# Patient Record
Sex: MALE | Race: WHITE | Employment: FULL TIME | URBAN - METROPOLITAN AREA
[De-identification: names, ages, dates, MRNs, and addresses within clinical notes are randomized per-mention and may not be internally consistent; named-entity substitution may affect disease eponyms.]

---

## 2020-09-09 ENCOUNTER — OFFICE VISIT (OUTPATIENT)
Dept: ORTHOPEDIC SURGERY | Age: 35
End: 2020-09-09
Payer: COMMERCIAL

## 2020-09-09 VITALS — HEIGHT: 78 IN | BODY MASS INDEX: 20.25 KG/M2 | WEIGHT: 175 LBS | RESPIRATION RATE: 12 BRPM

## 2020-09-09 PROBLEM — M54.50 LOW BACK PAIN: Status: ACTIVE | Noted: 2020-09-09

## 2020-09-09 PROCEDURE — 99204 OFFICE O/P NEW MOD 45 MIN: CPT | Performed by: STUDENT IN AN ORGANIZED HEALTH CARE EDUCATION/TRAINING PROGRAM

## 2020-09-09 RX ORDER — IBUPROFEN 200 MG
200 TABLET ORAL EVERY 6 HOURS PRN
COMMUNITY

## 2020-09-09 RX ORDER — METHYLPREDNISOLONE 4 MG/1
TABLET ORAL
Qty: 1 KIT | Refills: 0 | Status: SHIPPED | OUTPATIENT
Start: 2020-09-09 | End: 2020-09-15

## 2020-09-09 RX ORDER — CYCLOBENZAPRINE HCL 10 MG
10 TABLET ORAL NIGHTLY
Qty: 30 TABLET | Refills: 0 | Status: SHIPPED | OUTPATIENT
Start: 2020-09-09 | End: 2020-10-09

## 2020-09-09 NOTE — PROGRESS NOTES
New Patient: SPINE    Referring Provider:  No ref. provider found    Chief Complaint   Patient presents with    Lower Back Pain     NP, LSP    Leg Pain     Intermittent bilateral leg and groin pain       HISTORY OF PRESENT ILLNESS:      · The patient is being sent at the request of No ref. provider found in consultation as a new spine patient for low back pain and bilateral leg pain. The patient is a 38 y.o. male whom reports symptoms for several months. Symptoms progressed over the last  2 months. Patient reports there was not a significant event to cause the symptoms. Today discomfort is report at 5 out of 10, describing it as sharp, dull, aching, throbbing, shooting. Symptoms are aggravated by: quick movement, bending, lifting, playing golf. Patient has undergone recent treatment including, OTC NSAID's and chiropractor treatments. Patient denies previous lumbar spine surgery. · Mr. Fidencio Carpio presents as a new patient complaining of low back pain for several years. He states over past 4 months his pain has increased in frequency and intensity. He states he experiences flare ups quite often however he cannot identify certain triggers. The patient rates his pain 9/10 at its worst especially with lifting or pushing. Dull, aching pain radiates to the bilateral legs to the knees. He reports bending and lifting worsen his pain. Relieving measures include rest, elevation of the legs, stretching and NSAID's. The patient describes that he does have to limp when his pain increases in intensity. Recent treatment includes chiropractic care for 6-8 visits within the last year. Within the last 3 weeks he has had two visits with the chiropractor however this did not provide relief. The patient does not present with any ambulatory devices in the office. The patient can sit, stand and walk for 5 minutes without a considerable amount pain.      Pain Assessment  Location of Pain: Back(LSP)  Location Modifiers: Left, Right, Posterior(Legs)  Severity of Pain: 5  Quality of Pain: Dull, Other (Comment), Sharp(Radiating, shooting pain)  Duration of Pain: Persistent  Frequency of Pain: Constant  Aggravating Factors: Bending(Lifting; Playing golf; quick movements)  Limiting Behavior: Yes  Relieving Factors: Rest  Result of Injury: No  Work-Related Injury: No  Are there other pain locations you wish to document?: No      Associated signs and symptoms:   Neurogenic bowel or bladder symptoms:  no   Perceived weakness:  no   Difficulty walking:  no    Recent Imaging (within past one year)   Xrays: no   MRI or CT of spine: no    Current/Past Treatment:   · Physical Therapy:  none  · Chiropractic:  yes  · Injection:  none  · Medications:   NSAIDS:  yes   Muscle relaxer:  none   Steriods:  none   Neuropathic medications:  none   Opioids:  none  · Previous surgery:  no  · Previous surgical consult:  no  · Other:  · Infection control  · Tested positive for MRSA in past 12 months:  no  · Tested positive for MSSA \"staph infection\" in past 12 months: no  · Tested positive for VRE (Vancomycin Resistant Enterococci) in past 12 months:   no  · Currently on any antibiotics for an infection: no  · Anticoagulants:  · On a blood thinner:  no   · Any history of bleeding disorder: no   · MRI Contraindication: no   · Previous Pain Management: no   · Goal for treatment: Reduce pain  · How long can you stand? Varies    Sit? Varies      Walk? Varies      Past medical, surgical, social and family history reviewed with the patient.  No pertinent relevant history            Past Medical History:   Past Medical History:   Diagnosis Date    Low back pain       Past Surgical History:     Past Surgical History:   Procedure Laterality Date    NOSE SURGERY       Current Medications:     Current Outpatient Medications:     ibuprofen (ADVIL;MOTRIN) 200 MG tablet, Take 200 mg by mouth every 6 hours as needed for Pain, Disp: , Rfl:     methylPREDNISolone (MEDROL, RUDOLPH,) 4 MG tablet, Take by mouth., Disp: 1 kit, Rfl: 0    cyclobenzaprine (FLEXERIL) 10 MG tablet, Take 1 tablet by mouth nightly, Disp: 30 tablet, Rfl: 0  Allergies:  Patient has no known allergies. Social History:    reports that he has been smoking. He has never used smokeless tobacco. He reports current alcohol use. He reports that he does not use drugs. Family History:   Family History   Problem Relation Age of Onset    Asthma Mother     No Known Problems Father     Asthma Brother        REVIEW OF SYSTEMS: ROS - 14 point    Constitutional: No fevers, chills, night sweats, unexplained weight loss  Eye: No vision changes or diplopia  ENT: No nasal congestion, postnasal drip or sore throat. No tinnitus  Respiratory: No cough or SOB  CV: No chest pain or palpitations  GI: No nausea, abdominal pain, stool changes  : No dysuria or hematuria  Skin: No new or changing skin lesions, no rashes  MSK: No joint swelling, morning stiffness, unusual joint pain  Neurological: No headache, confusion, syncope  Psychiatric: No excessive anxiety or depression  Endocrine: No polyuria or polydipsia  Hematologic: No lymph node enlargement or excessive bleeding  Immunologic:No history of immune deficiency or immunomodulating drugs           PHYSICAL EXAM:    Vitals: Resp. rate 12, height 6' 6\" (1.981 m), weight 175 lb (79.4 kg). GENERAL EXAM:  · General Apparence: Patient is adequately groomed with no evidence of malnutrition. · Psychiatric: Orientation: The patient is oriented to time, place and person. The patient's mood and affect are appropriate   · Vascular: Examination reveals no swelling and palpation reveals no tenderness in upper or lower extremities. Good capillary refill.    · The lymphatic examination of the neck, axillae and groin reveals all areas to be without enlargement or induration   Sensation is intact without deficit in the upper and lower extremities to light touch and pinprick  · Coordination of the upper and lower extremities are normal.    CERVICAL EXAMINATION:  · Inspection: Local inspection shows no step-off or bruising. Cervical alignment is normal. No instability is noted. · Palpation and Percussion: No evidence of tenderness at the midline. Paraspinal tenderness is not present. There is no paraspinal spasm. · Range of Motion:  pain-free ROM   · Strength: 5/5 bilateral upper extremities  · Special Tests:   Spurling's and Hinds's are negative bilaterally. Mcgee and Impingement tests are negative bilaterally. · Skin:There are no rashes, ulcerations or lesions. · Reflexes: Bilaterally triceps, biceps and brachioradialis are 2+. Clonus absent bilaterally at the feet. No pathological reflexes are noted. · Gait & station:  normal, patient ambulates without assistance and no ataxia  · Additional Examinations:  · RIGHT UPPER EXTREMITY:  Inspection/examination of the right upper extremity does not show any tenderness, deformity or injury. Range of motion is normal and pain-free. There is no gross instability. There are no rashes, ulcerations or lesions. Strength and tone are normal. No atrophy or abnormal movements are noted. · LEFT UPPER EXTREMITY: Inspection/examination of the left upper extremity does not show any tenderness, deformity or injury. Range of motion is normal and pain-free. There is no gross instability. There are no rashes, ulcerations or lesions. Strength and tone are normal. No atrophy or abnormal movements are noted. LUMBAR/SACRAL EXAMINATION:  · Inspection: Local inspection shows no step-off or bruising. Lumbar alignment is normal. No instability is noted. · Palpation:   No evidence of tenderness at the midline. Lumbar paraspinal tenderness Mild L4/5 and L5/S1 tenderness  Bursal tenderness No tenderness bilaterally  There is no paraspinal spasm.   · Range of Motion: limited by 50% in all planes due to pain  · Strength:   Strength testing is 5/5 in all muscle groups tested. · Special Tests:   Straight leg raise positive left and crossed SLR negative. Jp's testing is negative bilaterally. FADIR's testing is negative bilaterally. Slump test negative. Bowstring test negative  · Skin: There are no rashes, ulcerations or lesions. · Reflexes: Reflexes are symmetrically 1+ at the patellar and ankle tendons. Clonus absent bilaterally at the feet. · Gait & station: normal, patient ambulates without assistance and no ataxia  · Additional Examinations:  · RIGHT LOWER EXTREMITY: Inspection/examination of the right lower extremity does not show any tenderness, deformity or injury. Range of motion is unremarkable. There is no gross instability. There are no rashes, ulcerations or lesions. Strength and tone are normal. No atrophy or abnormal movements are noted. · LEFT LOWER EXTREMITY:  Inspection/examination of the left lower extremity does not show any tenderness, deformity or injury. Range of motion is unremarkable. There is no gross instability. There are no rashes, ulcerations or lesions. Strength and tone are normal. No atrophy or abnormal movements are noted. Diagnostic Testing:    Xrays:   AP and lateral of the lumbar spine taken today in the office show 5 lumbar type vertebrae, good alignment on lateral films, mild L5/S1 DDD  MRI or CT:  None  EMG:  None  No results found for this or any previous visit. Impression (Medical Decision Making):       1. Lumbar radiculopathy    2. HNP (herniated nucleus pulposus), lumbar    3. Acute myofascial strain of lumbar region, initial encounter    4. Pain of lumbar spine    5. Acute bilateral low back pain without sciatica        Plan (Medical Decision Making):    I discussed the diagnosis and the treatment options with Stephania Crawford today.      In Summary:  The various treatment options were outlined and discussed with Stephania Crawford including:  Conservative care options: physical therapy, ice, medications, bracing, and activity modification. The indications for therapeutic injections. The indications for additional imaging/laboratory studies. The indications for (possible future) interventions. After considering the various options discussed, Brenda Kingston elected to pursue a course of treatment that includes the followin. Medications: I will prescribe a medrol dose pack to help with the inflammatory component of pain. Risks, benefits and alternatives were discussed. Recommended to stop any NSAIDs to reduce risk of GI bleed during the course of the dose pack. 2. PT:  I will start the patient on a trial of PT to work on a lumbar stabilization program to focus on core strengthening, core stabilizing, lumbar stretches, hamstring flexibility, modalities as indicated for 6-8 visits over the next 4-6 weeks. 3. Further studies: Setup Lumbar MR without contrast to evaluate for soft tissue pathology or stenosis contributing to the back pain and paresthesia. The patient has failed a three week trial of chiropractic care and home exercise program in the last 6 weeks. 4. Interventional:  After further imaging is obtained, interventional options will be reviewed and recommended. 5. Healthy Lifestyle Measures:  Patient education material reviewing the following was distributed to Brenda Kingston  Anatomic drawings  Healthy lifestyle education  Osteoporosis prevention,   Back and neck pain educational information   Advanced imaging preparedness    Posture education   Proper lifting and carrying techniques,   Weight management  Quitting smoking and   Minor ways to treat back pain  For further information regarding the spine conditions and to review interventional treatments the patient was directed to [facility].    6.  Follow up:  4-6 weeks    Brenda Kingston was instructed to call the office if his symptoms worsen or if new symptoms appear prior to the next scheduled visit.  He is specifically instructed to contact the office between now & his scheduled appointment if he has concerns related to his condition or if he needs assistance in scheduling the above tests. He is welcome to call for an appointment sooner if he has any additional concerns or questions. Macarena WILKERSON ATC, am scribing for Jaleel Cavazos.  09/09/20 10:17 AM Macarena Fatima. [facility], personally performed the services described in this documentation as scribed by Macarena Lizama ATC in my presence and it is both accurate and complete. The physical examination was performed between the patient and SHANNA Cavazos. All counseling during the appointment was performed between the patient and the provider. EDGAR AdamsC  Board Certified by the [employer] on Certification of Physician Assistants  Estella Urban 57  Partner of [employer]           This dictation was performed with a verbal recognition program [facility]) and it was checked for errors. It is possible that there are still dictated errors within this office note. If so, please bring any errors to my attention for an addendum. All efforts were made to ensure that this office note is accurate.

## 2020-09-10 ENCOUNTER — TELEPHONE (OUTPATIENT)
Dept: ORTHOPEDIC SURGERY | Age: 35
End: 2020-09-10

## 2020-09-10 NOTE — TELEPHONE ENCOUNTER
S/W PATIENT regarding MRI LSP approval and authorization being valid until 10/8/2020. Patient was instructed to call [employer] [facility] to schedule MRI LSP, then contact our office for follow up appointment. MRI LSP results will not be given over the phone or via [employer]. Patient was also asked to allow a minimum 48 hours for follow up appointment from MRI scan in order for staff to obtain MRI report. Patient currently has a follow up appointment schedule for 10/7 @ [facility]. Advised to contact the office if needing to reschedule this to accommodate MRI scan. Patient voiced understanding of MRI results not being given over the phone.

## 2020-09-18 ENCOUNTER — HOSPITAL ENCOUNTER (OUTPATIENT)
Dept: PHYSICAL THERAPY | Age: 35
Setting detail: THERAPIES SERIES
Discharge: HOME | End: 2020-09-18
Payer: COMMERCIAL

## 2020-09-18 PROCEDURE — 97161 PT EVAL LOW COMPLEX 20 MIN: CPT

## 2020-09-18 PROCEDURE — 97110 THERAPEUTIC EXERCISES: CPT

## 2020-09-18 NOTE — PLAN OF CARE
The [address] [place] [address] and [address]  Phone [phone]  Fax [phone]      Physical Therapy Certification    Dear Referring Practitioner: SHANNA Strauss,    We had the pleasure of evaluating the following patient for physical therapy services at [address] [place] [address]. A summary of our findings can be found in the initial assessment below. This includes our plan of care. If you have any questions or concerns regarding these findings, please do not hesitate to contact me at the office phone number checked above. Thank you for the referral.       Physician Signature:_______________________________Date:__________________  By signing above (or electronic signature), therapists plan is approved by physician      Patient: Leanna Garner   : 1985   MRN: [id number]  Referring Physician: Referring Practitioner: SHANNA Strauss      Evaluation Date: 2020      Medical Diagnosis Information:  Diagnosis: M54.16 (ICD-10-CM) - Lumbar radiculopathy ; M51.26 (ICD-10-CM) - HNP (herniated nucleus pulposus), lumbar; S39.012A (ICD-10-CM) - Acute myofascial strain of lumbar region, initial encounter   Treatment Diagnosis: M54.5 Low Back Pain                                         Insurance information: PT Insurance Information: [employer]     Precautions/ Contra-indications: None  Latex Allergy:  [x]NO      []YES  Preferred Language for Healthcare:   [x]English       []other:    SUBJECTIVE: Patient stated complaint: Patient is a 38year old male who presents to the clinic with reports of low back pain. Patient states that he has been having back pain for the past few months but within the past 8 months the pain has gotten worse. He states that the pain is becoming more frequent and he has discomfort everyday. He states that he is having trouble playing golf and performing his job.  He states that he decided to seek medical attention when he was unable to play a few rounds of golf. He states that he had radiographs performed that were negative for fracture but denies further imaging. He states that he is hoping to schedule and MRI in the next two weeks. He denies numbness and tingling into the LE's but states that he has dull achy pain and his increased tightness. He states that he has trouble with bending and twisting. He denies red flag symptoms at this time.      Relevant Medical History:  C-SSRS Triggered by Intake questionnaire (Past 2 wk assessment):   1 No, Questionnaire did not trigger screening.   0 Yes, Patient intake triggered further evaluation      0 C-SSRS Screening completed  0 PCP notified via Plan of Care  0 Emergency services notified    Functional Disability Index/G-Codes: Oswestry: 34% deficit    Pain Scale: 4-9/10  Easing factors: prone position; sitting  Provocative factors: Dressing; transitioning from forward flexion to extension; lifting     Type: [x]Constant           []Intermittent      []Radiating         []Localized         []other:                Numbness/Tingling: Denies     Occupation/School: [employer]     Living Status/Prior Level of Function: Independent with ADLs and IADLs,     OBJECTIVE:   ROM  9/18   Comments   Trunk flexion WNL + pain upon returning    Trunk extension WNL    Trunk R sidebend WNL    Trunk L sidebend WNL    Trunk R rotation WNL    Trunk L rotation WNL    HS flexibility Severe restriction                       Strength  9/18 Left Right Comments   Hip flexion(L2) 5 5 + pain present    Knee extension(L3) 5 5    Knee flexion(S1-2) 5 5    Ankle dorsiflexion(L4) 5 5    Toe extension(L5) 5 5    Ankle eversion/plantar flexion(S1) 5 5    Hip abd             Special tests 9/18   Comments   SLR      Slump test      Pelvic symmetry       Segmental Spinal mobility      Heel walk negative     Toe walk negative     Tandem walk negative                DTRs  9/18 Left Right Comments Patellar(L3-L4) = =     Achilles(S1-S2) = =                  Joint mobility: 9/18              [x]Normal               []Hypo              []Hyper     Palpation: Denies TTP  9/18     Functional Mobility/Transfers: Independent with increased pain with ADL's; light duty at work; difficulty sleeping; unable to sit for periods longer than 1 hour; unable to walk distances longer than 1 mile; tightness and pain with playing golf  9/18      Posture: Decreased lumbar lordosis  9/18     Gait: (include devices/WB status) WNL  9/18                          [x] Patient history, allergies, meds reviewed. Medical chart reviewed. See intake form. 9/18     Review Of Systems (ROS):  [x]Performed Review of systems (Integumentary, CardioPulmonary, Neurological) by intake and observation. Intake form has been scanned into medical record. Patient has been instructed to contact their primary care physician regarding ROS issues if not already being addressed at this time.   9/18     Co-morbidities/Complexities (which will affect course of rehabilitation):   [x]None              Arthritic conditions   []Rheumatoid arthritis (M05.9)  []Osteoarthritis (M19.91)    Cardiovascular conditions   []Hypertension (I10)  []Hyperlipidemia (E78.5)  []Angina pectoris (I20)  []Atherosclerosis (I70)    Musculoskeletal conditions   []Disc pathology   []Congenital spine pathologies   []Prior surgical intervention  []Osteoporosis (M81.8)  []Osteopenia (M85.8)   Endocrine conditions   []Hypothyroid (E03.9)  []Hyperthyroid Gastrointestinal conditions   []Constipation (H82.55)    Metabolic conditions   []Morbid obesity (E66.01)  []Diabetes type 1(E10.65) or 2 (E11.65)   []Neuropathy (G60.9)      Pulmonary conditions   []Asthma (J45)  []Coughing   []COPD (J44.9)    Psychological Disorders  []Anxiety (F41.9)  []Depression (F32.9)   []Other:    []Other:            Barriers to/and or personal factors that will affect rehab potential:              [x]Age  [x]Sex [x]Motivation/Lack of Motivation                        []Co-Morbidities              []Cognitive Function, education/learning barriers              []Environmental, home barriers              []profession/work barriers  [x]past PT/medical experience  []other:  Justification:      Falls Risk Assessment (30 days):   [x] Falls Risk assessed and no intervention required.   [] Falls Risk assessed and Patient requires intervention due to being higher risk   TUG score (>12s at risk):     [] Falls education provided, including        ASSESSMENT:   Functional Impairments:                []Noted lumbar/proximal hip hypomobility              []Noted lumbosacral and/or generalized hypermobility              [x]Decreased Lumbosacral/hip/LE functional ROM              [x]Decreased core/proximal hip strength and neuromuscular control               [x]Decreased LE functional strength               []Abnormal reflexes/sensation/myotomal/dermatomal deficits  []Reduced balance/proprioceptive control               []other:       Functional Activity Limitations (from functional questionnaire and intake)              [x]Reduced ability to tolerate prolonged functional positions              [x]Reduced ability or difficulty with changes of positions or transfers between positions              [x]Reduced ability to maintain good posture and demonstrate good body mechanics with sitting, bending, and lifting              [x]Reduced ability to sleep              [] Reduced ability or tolerance with driving and/or computer work              [x]Reduced ability to perform lifting, reaching, carrying tasks              [x]Reduced ability to squat              [x]Reduced ability to forward bend              [x]Reduced ability to ambulate prolonged functional periods/distances/surfaces              []Reduced ability to ascend/descend stairs              []other:       Participation Restrictions              []Reduced participation in self (impairments), activity limitations, and/or participation restrictions;:  [x] a total of 1-2 or more elements   [] a total of 3 or more elements   [] a total of 4 or more elements   [x] A clinical presentation with:  [x] stable and/or uncomplicated characteristics   [] evolving clinical presentation with changing characteristics  [] unstable and unpredictable characteristics;   [x] Clinical decision making of [x] low, [] moderate, [] high complexity using standardized patient assessment instrument and/or measurable assessment of functional outcome. [x] EVAL (LOW) 19949 (typically 20 minutes face-to-face)  [] EVAL (MOD) 15723 (typically 30 minutes face-to-face)  [] EVAL (HIGH) 67665 (typically 45 minutes face-to-face)  [] RE-EVAL            PLAN:      Frequency/Duration:  1-2 days per week for 6 Weeks:  Interventions:  [x]  Therapeutic exercise including: strength training, ROM, for LE, Glutes and core   [x]  NMR activation and proprioception for glutes , LE and Core   [x]  Manual therapy as indicated for Hip complex, LE and spine to include: Dry Needling/IASTM, STM, PROM, Gr I-IV mobilizations, manipulation. [x]  Modalities as needed that may include: thermal agents, E-stim, Biofeedback, US, iontophoresis as indicated  [x]  Patient education on joint protection, postural re-education, activity modification, progression of HEP. HEP instruction:   Access Code: [email or web address]   URL: [email or web address]   Date: 09/18/2020   Prepared by: Alexia Gerber        GOALS:   Patient stated goal: Reduce pain; Be able to play golf    [] Progressing: [] Met: [] Not Met: [] Adjusted    Therapist goals for Patient:   Short Term Goals: To be achieved in: 2 weeks  1. Independent in HEP and progression per patient tolerance, in order to prevent re-injury. [] Progressing: [] Met: [] Not Met: [] Adjusted   2.  Patient will have a decrease in pain to facilitate improvement in movement, function, and ADLs as indicated by Functional Deficits. [] Progressing: [] Met: [] Not Met: [] Adjusted    Long Term Goals: To be achieved in: 6 weeks  1. Disability index score of 15% or less for the [place] to assist with reaching prior level of function. [] Progressing: [] Met: [] Not Met: [] Adjusted  2. Patient will demonstrate increased AROM to WNL, good LS mobility, good hip ROM to allow for proper joint functioning as indicated by patients Functional Deficits. [] Progressing: [] Met: [] Not Met: [] Adjusted  3. Patient will demonstrate an increase in Strength to good proximal hip and core activation to allow for proper functional mobility as indicated by patients Functional Deficits. [] Progressing: [] Met: [] Not Met: [] Adjusted  4. Patient will return to Independent functional activities without increased symptoms or restriction. [] Progressing: [] Met: [] Not Met: [] Adjusted  5. Patient will report playing a round of golf with no increase in pain.     [] Progressing: [] Met: [] Not Met: [] Adjusted       Electronically signed by: Emanuel Chowdhury, PT, DPT

## 2020-09-18 NOTE — FLOWSHEET NOTE
[address] [facility],  [address]  Phone: [phone]   Fax:     [phone]    Physical Therapy Daily Treatment Note  Date:  2020    Patient Name:  Nguyen Park    :  1985  MRN: [id number]  Restrictions/Precautions:    Medical/Treatment Diagnosis Information:  Diagnosis: M54.16 (ICD-10-CM) - Lumbar radiculopathy ; M51.26 (ICD-10-CM) - HNP (herniated nucleus pulposus), lumbar; S39.012A (ICD-10-CM) - Acute myofascial strain of lumbar region, initial encounter  Treatment Diagnosis: M54.5 Low Back Pain  Insurance/Certification information:  PT Insurance Information: [employer]  Physician Information:  Referring Practitioner: SHANNA Allison  Has the plan of care been signed (Y/N):        []  Yes  [x]  No     Date of Patient follow up with Physician:       Is this a Progress Report:     []  Yes  [x]  No        If Yes:  Date Range for reporting period:  Beginning 20  Ending    Progress report will be due (10 Rx or 30 days whichever is less):       Recertification will be due (POC Duration  / 90 days whichever is less):         Visit # Insurance Allowable Auth Required   1  20 []  Yes [x]  No        Functional Scale: Oswestry: 36% deficit   Date assessed:  20     Latex Allergy:  [x]NO      []YES  Preferred Language for Healthcare:   [x]English       []other:      Pain level:  4-9/10     SUBJECTIVE:  See eval        OBJECTIVE:   ROM     Comments   Trunk flexion WNL + pain upon returning    Trunk extension WNL    Trunk R sidebend WNL    Trunk L sidebend WNL    Trunk R rotation WNL    Trunk L rotation WNL    HS flexibility Severe restriction                       Strength   Left Right Comments   Hip flexion(L2) 5 5 + pain present    Knee extension(L3) 5 5    Knee flexion(S1-2) 5 5    Ankle dorsiflexion(L4) 5 5    Toe extension(L5) 5 5    Ankle eversion/plantar flexion(S1) 5 5 Hip abd             Special tests 9/18   Comments   SLR      Slump test      Pelvic symmetry       Segmental Spinal mobility      Heel walk negative     Toe walk negative     Tandem walk negative                DTRs  9/18 Left Right Comments   Patellar(L3-L4) = =     Achilles(S1-S2) = =                  Joint mobility: 9/18              [x]Normal               []Hypo              []Hyper     Palpation: Denies TTP  9/18     Functional Mobility/Transfers: Independent with increased pain with ADL's; light duty at work; difficulty sleeping; unable to sit for periods longer than 1 hour; unable to walk distances longer than 1 mile; tightness and pain with playing golf  9/18      Posture: Decreased lumbar lordosis  9/18     Gait: (include devices/WB status) WNL  9/18                          [x] Patient history, allergies, meds reviewed. Medical chart reviewed. See intake form. 9/18     Review Of Systems (ROS):  [x]Performed Review of systems (Integumentary, CardioPulmonary, Neurological) by intake and observation. Intake form has been scanned into medical record. Patient has been instructed to contact their primary care physician regarding ROS issues if not already being addressed at this time.   9/18    RESTRICTIONS/PRECAUTIONS: None    Exercises/Interventions:   ROM/stretches     SKTC     DKTC 30 sec x 5    Prayer stretch     Supine HS 30 sec x 5    Pelvic tilt     Hook lying rotation 30x    Cat and camel 30x    Supine Piriformis 30 sec x 5    Strengthening     SLR     Quadruped alternate UE reaches     Quadruped alternate LE reaches     Quadruped alternate UE/LE reaches     Enconcert heel raises     Sit ups      planks     Tband lat pulls     Tband rows         Manual Intervention             Prone PA      GISTM/STM      Lumbar Manip      SI Manip      Hip belt mobs      Hip LA distraction              Therapeutic Exercise and NMR EXR  [x] (10844) Provided verbal/tactile cueing for activities related to strengthening, flexibility, endurance, ROM  for improvements in proximal hip and core control with self care, mobility, lifting and ambulation.  [] (33506) Provided verbal/tactile cueing for activities related to improving balance, coordination, kinesthetic sense, posture, motor skill, proprioception  to assist with core control in self care, mobility, lifting, and ambulation. Therapeutic Activities:    [] (64866 or 48112) Provided verbal/tactile cueing for activities related to improving balance, coordination, kinesthetic sense, posture, motor skill, proprioception and motor activation to allow for proper function  with self care and ADLs  [] (20959) Provided training and instruction to the patient for proper core and proximal hip recruitment and positioning with ambulation re-education     Home Exercise Program:    [x] (84861) Reviewed/Progressed HEP activities related to strengthening, flexibility, endurance, ROM of core, proximal hip and LE for functional self-care, mobility, lifting and ambulation   [] (43207) Reviewed/Progressed HEP activities related to improving balance, coordination, kinesthetic sense, posture, motor skill, proprioception of core, proximal hip and LE for self care, mobility, lifting, and ambulation      Manual Treatments:  PROM / STM / Oscillations-Mobs:  G-I, II, III, IV (PA's, Inf., Post.)  [] (21650) Provided manual therapy to mobilize proximal hip and LS spine soft tissue/joints for the purpose of modulating pain, promoting relaxation,  increasing ROM, reducing/eliminating soft tissue swelling/inflammation/restriction, improving soft tissue extensibility and allowing for proper ROM for normal function with self care, mobility, lifting and ambulation.      Modalities: P 15 min  9/18      Charges:  Timed Code Treatment Minutes: EVAL + 25   Total Treatment Minutes: 60       [x] EVAL (LOW) 02681 (typically 20 minutes face-to-face)  [] EVAL (MOD) 88968 (typically 30 minutes

## 2020-09-25 ENCOUNTER — HOSPITAL ENCOUNTER (OUTPATIENT)
Dept: PHYSICAL THERAPY | Age: 35
Setting detail: THERAPIES SERIES
Discharge: HOME | End: 2020-09-25
Payer: COMMERCIAL

## 2020-09-25 PROCEDURE — 97110 THERAPEUTIC EXERCISES: CPT

## 2020-09-25 PROCEDURE — 97140 MANUAL THERAPY 1/> REGIONS: CPT

## 2020-09-25 NOTE — FLOWSHEET NOTE
[place] [id number] [address]  Phone: [phone]   Fax:     [phone]    Physical Therapy Daily Treatment Note  Date:  2020    Patient Name:  Bradley Haddad    :  1985  MRN: [id number]  Restrictions/Precautions:    Medical/Treatment Diagnosis Information:  Diagnosis: M54.16 (ICD-10-CM) - Lumbar radiculopathy ; M51.26 (ICD-10-CM) - HNP (herniated nucleus pulposus), lumbar; S39.012A (ICD-10-CM) - Acute myofascial strain of lumbar region, initial encounter  Treatment Diagnosis: M54.5 Low Back Pain  Insurance/Certification information:  PT Insurance Information: [employer]  Physician Information:  Referring Practitioner: SHANNA Glover  Has the plan of care been signed (Y/N):        []  Yes  [x]  No     Date of Patient follow up with Physician:       Is this a Progress Report:     []  Yes  [x]  No        If Yes:  Date Range for reporting period:  Beginning 20  Ending    Progress report will be due (10 Rx or 30 days whichever is less):       Recertification will be due (POC Duration  / 90 days whichever is less):         Visit # Insurance Allowable Auth Required   2  20 []  Yes [x]  No        Functional Scale: Oswestry: 36% deficit   Date assessed:  20     Latex Allergy:  [x]NO      []YES  Preferred Language for Healthcare:   [x]English       []other:      Pain level:  2/10     SUBJECTIVE:   Patient states that he woke up feeling really tight this morning. He states that he did his stretches and feels a little better.      OBJECTIVE:   ROM     Comments   Trunk flexion WNL + pain upon returning    Trunk extension WNL    Trunk R sidebend WNL    Trunk L sidebend WNL    Trunk R rotation WNL    Trunk L rotation WNL    HS flexibility Severe restriction                       Strength   Left Right Comments   Hip flexion(L2) 5 5 + pain present    Knee extension(L3) 5 5    Knee flexion(S1-2) 5 5    Ankle dorsiflexion(L4) 5 5    Toe extension(L5) 5 5    Ankle eversion/plantar flexion(S1) 5 5    Hip abd             Special tests 9/18   Comments   SLR      Slump test      Pelvic symmetry       Segmental Spinal mobility      Heel walk negative     Toe walk negative     Tandem walk negative                DTRs  9/18 Left Right Comments   Patellar(L3-L4) = =     Achilles(S1-S2) = =                  Joint mobility: 9/18              [x]Normal               []Hypo              []Hyper     Palpation: Denies TTP  9/18     Functional Mobility/Transfers: Independent with increased pain with ADL's; light duty at work; difficulty sleeping; unable to sit for periods longer than 1 hour; unable to walk distances longer than 1 mile; tightness and pain with playing golf  9/18      Posture: Decreased lumbar lordosis  9/18     Gait: (include devices/WB status) WNL  9/18                          [x] Patient history, allergies, meds reviewed. Medical chart reviewed. See intake form. 9/18     Review Of Systems (ROS):  [x]Performed Review of systems (Integumentary, CardioPulmonary, Neurological) by intake and observation. Intake form has been scanned into medical record. Patient has been instructed to contact their primary care physician regarding ROS issues if not already being addressed at this time.   9/18    RESTRICTIONS/PRECAUTIONS: None    Exercises/Interventions:   ROM/stretches     SKTC     DKTC 30 sec x 5    Prayer stretch     Supine HS 30 sec x 5    Pelvic tilt     Hook lying rotation 30x    Cat and camel 30x    Supine Piriformis 30 sec x 5    Strengthening     TA Iso 10 sec x 10 Start 9/25   TA Iso + alt marching 10 sec x 10 Start 9/25   SLR 3 x 10 Start 9/25   Quadruped alternate UE reaches     Quadruped alternate LE reaches     Quadruped alternate UE/LE reaches     Cellmax heel raises     Sit ups      planks     Tband lat pulls     Tband rows         Manual Intervention             Prone PA      GISTM/STM      Lumbar Manip      SI Manip      Hip belt mobs      Hip LA distraction 10 min  Start 9/25   [facility] Thoracic paraspinals 10 min  Start 9/25     Therapeutic Exercise and NMR EXR  [x] (08987) Provided verbal/tactile cueing for activities related to strengthening, flexibility, endurance, ROM  for improvements in proximal hip and core control with self care, mobility, lifting and ambulation.  [] (16647) Provided verbal/tactile cueing for activities related to improving balance, coordination, kinesthetic sense, posture, motor skill, proprioception  to assist with core control in self care, mobility, lifting, and ambulation.      Therapeutic Activities:    [] (33847 or 65589) Provided verbal/tactile cueing for activities related to improving balance, coordination, kinesthetic sense, posture, motor skill, proprioception and motor activation to allow for proper function  with self care and ADLs  [] (92007) Provided training and instruction to the patient for proper core and proximal hip recruitment and positioning with ambulation re-education     Home Exercise Program:    [x] (84453) Reviewed/Progressed HEP activities related to strengthening, flexibility, endurance, ROM of core, proximal hip and LE for functional self-care, mobility, lifting and ambulation   [] (42341) Reviewed/Progressed HEP activities related to improving balance, coordination, kinesthetic sense, posture, motor skill, proprioception of core, proximal hip and LE for self care, mobility, lifting, and ambulation      Manual Treatments:  PROM / STM / Oscillations-Mobs:  G-I, II, III, IV (PA's, Inf., Post.)  [x] (42245) Provided manual therapy to mobilize proximal hip and LS spine soft tissue/joints for the purpose of modulating pain, promoting relaxation,  increasing ROM, reducing/eliminating soft tissue swelling/inflammation/restriction, improving soft tissue extensibility and allowing for proper ROM for normal function with self care, mobility, lifting and ambulation. Modalities: [facility] 10 min  9/25     Charges:  Timed Code Treatment Minutes: 45   Total Treatment Minutes: 62  235-337       [] EVAL (LOW) 25231 (typically 20 minutes face-to-face)  [] EVAL (MOD) 37275 (typically 30 minutes face-to-face)  [] EVAL (HIGH) 06003 (typically 45 minutes face-to-face)  [] RE-EVAL      [x] EC(38931) x 1   [] IONTO  [] NMR (52287) x     [] VASO  [x] Manual (49326) x 2     [] Other:  [] TA x      [] Mech Traction (06552)  [] ES(attended) (26151)      [] ES (un) (26285):     Goals:   Patient stated goal: Reduce pain; Be able to play golf    [] Progressing: [] Met: [] Not Met: [] Adjusted    Therapist goals for Patient:   Short Term Goals: To be achieved in: 2 weeks  1. Independent in HEP and progression per patient tolerance, in order to prevent re-injury. [] Progressing: [] Met: [] Not Met: [] Adjusted   2. Patient will have a decrease in pain to facilitate improvement in movement, function, and ADLs as indicated by Functional Deficits. [] Progressing: [] Met: [] Not Met: [] Adjusted    Long Term Goals: To be achieved in: 6 weeks  1. Disability index score of 15% or less for the [place] to assist with reaching prior level of function. [] Progressing: [] Met: [] Not Met: [] Adjusted  2. Patient will demonstrate increased AROM to WNL, good LS mobility, good hip ROM to allow for proper joint functioning as indicated by patients Functional Deficits. [] Progressing: [] Met: [] Not Met: [] Adjusted  3. Patient will demonstrate an increase in Strength to good proximal hip and core activation to allow for proper functional mobility as indicated by patients Functional Deficits. [] Progressing: [] Met: [] Not Met: [] Adjusted  4. Patient will return to Independent functional activities without increased symptoms or restriction. [] Progressing: [] Met: [] Not Met: [] Adjusted  5. Patient will report playing a round of golf with no increase in pain.     [] Progressing: [] Met: [] Not Met: [] Adjusted     Overall Progression Towards Functional goals/ Treatment Progress Update:  [] Patient is progressing as expected towards functional goals listed. [] Progression is slowed due to complexities/Impairments listed. [] Progression has been slowed due to co-morbidities. [x] Plan just implemented, too soon to assess goals progression <30days   [] Goals require adjustment due to lack of progress  [] Patient is not progressing as expected and requires additional follow up with physician  [] Other    Prognosis for POC: [x] Good [] Fair  [] Poor      Patient requires continued skilled intervention: [x] Yes  [] No    Treatment/Activity Tolerance:  [x] Patient able to complete treatment  [] Patient limited by fatigue  [] Patient limited by pain     [] Patient limited by other medical complications  [x] Other: 9/25 Patient tolerated treatment well this session. Reported fatigue with addition of core exercises. Patient reported feeling better end of session. HEP updated and reviewed. Continue to progress as tolerated. Patient education:      Prognosis: [x] Good [] Fair  [] Poor    Patient Requires Follow-up: [x] Yes  [] No    PLAN: See eval  [x] Continue per plan of care [] Alter current plan (see comments)  [] Plan of care initiated [] Hold pending MD visit [] Discharge    Electronically signed by: Merle Desai PT, DPT    *If patient does not return for further follow ups after this date. Please consider this as the patients discharge from physical therapy.

## 2020-10-02 ENCOUNTER — HOSPITAL ENCOUNTER (OUTPATIENT)
Dept: PHYSICAL THERAPY | Age: 35
Setting detail: THERAPIES SERIES
Discharge: HOME | End: 2020-10-02
Payer: COMMERCIAL

## 2020-10-02 PROCEDURE — 97110 THERAPEUTIC EXERCISES: CPT

## 2020-10-02 PROCEDURE — 97140 MANUAL THERAPY 1/> REGIONS: CPT

## 2020-10-02 NOTE — FLOWSHEET NOTE
5 5    Knee flexion(S1-2) 5 5    Ankle dorsiflexion(L4) 5 5    Toe extension(L5) 5 5    Ankle eversion/plantar flexion(S1) 5 5    Hip abd             Special tests 9/18   Comments   SLR      Slump test      Pelvic symmetry       Segmental Spinal mobility      Heel walk negative     Toe walk negative     Tandem walk negative                DTRs  9/18 Left Right Comments   Patellar(L3-L4) = =     Achilles(S1-S2) = =                  Joint mobility: 9/18              [x]Normal               []Hypo              []Hyper     Palpation: Denies TTP  9/18     Functional Mobility/Transfers: Independent with increased pain with ADL's; light duty at work; difficulty sleeping; unable to sit for periods longer than 1 hour; unable to walk distances longer than 1 mile; tightness and pain with playing golf  9/18      Posture: Decreased lumbar lordosis  9/18     Gait: (include devices/WB status) WNL  9/18                          [x] Patient history, allergies, meds reviewed. Medical chart reviewed. See intake form. 9/18     Review Of Systems (ROS):  [x]Performed Review of systems (Integumentary, CardioPulmonary, Neurological) by intake and observation. Intake form has been scanned into medical record. Patient has been instructed to contact their primary care physician regarding ROS issues if not already being addressed at this time.   9/18    RESTRICTIONS/PRECAUTIONS: None    Exercises/Interventions:   ROM/stretches     SKTC     DKTC 30 sec x 5    Prayer stretch     Supine HS 30 sec x 5    Pelvic tilt     Hook lying rotation 30x    Cat and camel 30x    Supine Piriformis 30 sec x 5    Strengthening     Quadruped alternate UE reaches     Quadruped alternate LE reaches     Quadruped alternate UE/LE reaches     paloff press 3 x 10; black tband Bilateral; Start 10/2   Golf swing with band 10x bilateral; blue tband Start 10/2   Ball squats     Ball heel raises     Sit ups      planks     Tband lat pulls     Tband rows         Manual Intervention             Prone PA      GISTM/STM      Lumbar Manip      SI Manip      Hip belt mobs      Hip LA distraction 10 min  Start 9/25   [facility] Thoracic paraspinals 10 min  Start 9/25     Therapeutic Exercise and NMR EXR  [x] (14028) Provided verbal/tactile cueing for activities related to strengthening, flexibility, endurance, ROM  for improvements in proximal hip and core control with self care, mobility, lifting and ambulation.  [] (45712) Provided verbal/tactile cueing for activities related to improving balance, coordination, kinesthetic sense, posture, motor skill, proprioception  to assist with core control in self care, mobility, lifting, and ambulation.      Therapeutic Activities:    [] (10455 or 98917) Provided verbal/tactile cueing for activities related to improving balance, coordination, kinesthetic sense, posture, motor skill, proprioception and motor activation to allow for proper function  with self care and ADLs  [] (54777) Provided training and instruction to the patient for proper core and proximal hip recruitment and positioning with ambulation re-education     Home Exercise Program:    [x] (54363) Reviewed/Progressed HEP activities related to strengthening, flexibility, endurance, ROM of core, proximal hip and LE for functional self-care, mobility, lifting and ambulation   [] (97488) Reviewed/Progressed HEP activities related to improving balance, coordination, kinesthetic sense, posture, motor skill, proprioception of core, proximal hip and LE for self care, mobility, lifting, and ambulation      Manual Treatments:  PROM / STM / Oscillations-Mobs:  G-I, II, III, IV (PA's, Inf., Post.)  [x] (91955) Provided manual therapy to mobilize proximal hip and LS spine soft tissue/joints for the purpose of modulating pain, promoting relaxation,  increasing ROM, reducing/eliminating soft tissue swelling/inflammation/restriction, improving soft tissue extensibility and allowing for proper ROM for with no increase in pain. [] Progressing: [] Met: [] Not Met: [] Adjusted     Overall Progression Towards Functional goals/ Treatment Progress Update:  [] Patient is progressing as expected towards functional goals listed. [] Progression is slowed due to complexities/Impairments listed. [] Progression has been slowed due to co-morbidities. [x] Plan just implemented, too soon to assess goals progression <30days   [] Goals require adjustment due to lack of progress  [] Patient is not progressing as expected and requires additional follow up with physician  [] Other    Prognosis for POC: [x] Good [] Fair  [] Poor      Patient requires continued skilled intervention: [x] Yes  [] No    Treatment/Activity Tolerance:  [x] Patient able to complete treatment  [] Patient limited by fatigue  [] Patient limited by pain     [] Patient limited by other medical complications  [x] Other: 10/2 Patient tolerated treatment well this session. Reported fatigue with addition of core exercises. Patient reported feeling better end of session. Will follow-up with patient after playing golf and MD appointment. Continue to progress as tolerated. Patient education:      Prognosis: [x] Good [] Fair  [] Poor    Patient Requires Follow-up: [x] Yes  [] No    PLAN: See eval  [x] Continue per plan of care [] Alter current plan (see comments)  [] Plan of care initiated [] Hold pending MD visit [] Discharge    Electronically signed by: Chapito Mendoza PT, DPT    *If patient does not return for further follow ups after this date. Please consider this as the patients discharge from physical therapy.

## 2020-10-05 ENCOUNTER — TELEPHONE (OUTPATIENT)
Dept: ORTHOPEDIC SURGERY | Age: 35
End: 2020-10-05

## 2020-10-05 NOTE — TELEPHONE ENCOUNTER
Patient calls desk phone stating the order for MRI will need to be resent to [facility]. S/W PATIENT explained that order has been re-faxed from 9/10/2020.

## 2020-10-07 ENCOUNTER — OFFICE VISIT (OUTPATIENT)
Dept: ORTHOPEDIC SURGERY | Age: 35
End: 2020-10-07
Payer: COMMERCIAL

## 2020-10-07 VITALS — HEIGHT: 78 IN | BODY MASS INDEX: 20.25 KG/M2 | TEMPERATURE: 97.7 F | WEIGHT: 175.04 LBS | RESPIRATION RATE: 12 BRPM

## 2020-10-07 PROCEDURE — 99213 OFFICE O/P EST LOW 20 MIN: CPT | Performed by: STUDENT IN AN ORGANIZED HEALTH CARE EDUCATION/TRAINING PROGRAM

## 2020-10-07 NOTE — PROGRESS NOTES
Follow up: Vasquez  1985  [id number]         Chief Complaint   Patient presents with    Lower Back Pain     TR MRI LSP         HISTORY OF PRESENT ILLNESS:  Mr. Sukhdev Jean is a 38 y.o. male returns for a follow up visit for multiple medical problems. His current presenting problems are   1. HNP (herniated nucleus pulposus), lumbar    2. Lumbar radiculopathy    3. Acute myofascial strain of lumbar region, initial encounter    . As per information/history obtained from the PADT(patient assessment and documentation tool) - He complains of pain in the lower back with radiation to the buttocks and upper leg Bilateral He rates the pain 2/10 and describes it as aching. Pain is made worse by: sleeping, morning time. He denies side effects from the current pain regimen. Patient reports that since the last follow up visit the physical functioning is better, family/social relationships are better, mood is better and sleep patterns are better, and that the overall functioning is better. Patient denies neurological bowel or bladder. Mr. Sukhdev Jean presents today regarding his lumbar spine MRI completed on 10/6/2020. He has attended three physical therapy visits since his initial visit on 9/9/2020. Patient reports that this intervention has helped significantly, particularly the stretching regimen he was provided. He reports compliance with his home exercise program as well. The patient describes that there is a decent amount of stiffness in the morning time, however he is feeling much better during the day. The patient does not report radicular pain at this time and describes that the Medrol Dosepak and physical therapy has alleviated his leg pain. Other alleviating factors include stretching and heat. He does not present today with any ambulatory aids or brace utilization.  The patient describes being able to sit for 20-30 minutes, stand for various amounts of time  and walk for various amounts of time with minimal to no symptoms. Associated signs and symptoms:   Neurogenic bowel or bladder symptoms:  no   Perceived weakness:  no   Difficulty walking:  no            Past medical, surgical, social and family history reviewed with the patient. No pertinent relevant history. Past Medical History:   Past Medical History:   Diagnosis Date    Low back pain       Past Surgical History:     Past Surgical History:   Procedure Laterality Date    NOSE SURGERY       Current Medications:     Current Outpatient Medications:     ibuprofen (ADVIL;MOTRIN) 200 MG tablet, Take 200 mg by mouth every 6 hours as needed for Pain, Disp: , Rfl:     cyclobenzaprine (FLEXERIL) 10 MG tablet, Take 1 tablet by mouth nightly, Disp: 30 tablet, Rfl: 0  Allergies:  Patient has no known allergies. Social History:    reports that he has been smoking. He has never used smokeless tobacco. He reports current alcohol use. He reports that he does not use drugs. Family History:   Family History   Problem Relation Age of Onset    Asthma Mother     No Known Problems Father     Asthma Brother        REVIEW OF SYSTEMS:   CONSTITUTIONAL: Denies unexplained weight loss, fevers, chills or fatigue  NEUROLOGICAL: Denies unsteady gait or progressive weakness  MUSCULOSKELETAL: Denies joint swelling or redness  GI: Denies nausea, vomiting, diarrhea   : Denies bowel or bladder issues       PHYSICAL EXAM:    Vitals: Temperature 97.7 °F (36.5 °C), resp. rate 12, height 6' 5.99\" (1.981 m), weight 175 lb 0.7 oz (79.4 kg). GENERAL EXAM:  · General Apparence: Patient is adequately groomed with no evidence of malnutrition. · Psychiatric: Orientation: The patient is oriented to time, place and person. The patient's mood and affect are appropriate   · Vascular: Examination reveals no swelling and palpation reveals no tenderness in upper or lower extremities. Good capillary refill.    · The lymphatic examination of the neck, axillae and groin reveals all areas to be without enlargement or induration  · Sensation is intact without deficit in the upper and lower extremities to light touch and pinprick  · Coordination of the upper and lower extremities are normal.  · RIGHT UPPER EXTREMITY:  Inspection/examination of the right upper extremity does not show any tenderness, deformity or injury. Range of motion is unremarkable and pain-free. There is no gross instability. There are no rashes, ulcerations or lesions. Strength and tone are normal. No atrophy or abnormal movements are noted. · LEFT UPPER EXTREMITY: Inspection/examination of the left upper extremity does not show any tenderness, deformity or injury. Range of motion is unremarkable and pain-free. There is no gross instability. There are no rashes, ulcerations or lesions. Strength and tone are normal. No atrophy or abnormal movements are noted. LUMBAR/SACRAL EXAMINATION:  · Inspection: Local inspection shows no step-off or bruising. Lumbar alignment is normal. No instability is noted. · Palpation:   No evidence of tenderness at the midline. Lumbar paraspinal tenderness: Mild L4/5 and L5/S1 tenderness  Bursal tenderness No tenderness bilaterally  There is no paraspinal spasm. · Range of Motion: pain-free ROM  · Strength:   Strength testing is 5/5 in all muscle groups tested. · Special Tests:   Straight leg raise and crossed SLR negative. Pj's testing is negative bilaterally. FADIR's testing is negative bilaterally. Bowstring test negative. Slump test negative. · Skin: There are no rashes, ulcerations or lesions. · Reflexes: Reflexes are symmetrically 1+ at the patellar and ankle tendons. Clonus absent bilaterally at the feet. · Gait & station: normal, patient ambulates without assistance and no ataxia  · Additional Examinations:  · RIGHT LOWER EXTREMITY: Inspection/examination of the right lower extremity does not show any tenderness, deformity or injury. Range of motion is normal and pain-free.  There is no gross instability. There are no rashes, ulcerations or lesions. Strength and tone are normal. No atrophy or abnormal movements are noted. · LEFT LOWER EXTREMITY:  Inspection/examination of the left lower extremity does not show any tenderness, deformity or injury. Range of motion is normal and pain-free. There is no gross instability. There are no rashes, ulcerations or lesions. Strength and tone are normal. No atrophy or abnormal movements are noted. Diagnostic Testing:    MR Lumbar spine shows  10//  At the L5-S1 level there is a left-sided protrusion type disc herniation projecting into the recess and proximal left L5-S1 foramen. The right L5-S1 foramen is patent. The L4-5 level shows concentric bulging disc and annular tears without evidence of focal disc herniation or central spinal canal stenosis. The neural foramina patent. L3-L4, L2-L3, L1-L2, T12-L1 and T11-12 levels are normal  No results found for this or any previous visit. Impression:       1. HNP (herniated nucleus pulposus), lumbar    2. Lumbar radiculopathy    3. Acute myofascial strain of lumbar region, initial encounter        Plan:  Clinical Course: Above diagnoses are improving     I discussed the diagnosis and the treatment options with Fam Perkins today. In Summary:  The various treatment options were outlined and discussed with Fam Perkins including:  Conservative care options: physical therapy, ice, medications, bracing, and activity modification. The indications for therapeutic injections. The indications for additional imaging/laboratory studies. The indications for (possible future) interventions. After considering the various options discussed, Fam Perkins elected to pursue a course of treatment that includes the followin. Medications:  Continue anti-inflammatories with appropriate GI Precautions including to stop if develop dark tarry stools or GI upset and to take with food.     2. PT:  Encouraged to continue with Home exercise program.  Finish physical therapy visits as prescribed. 3. Further studies: No further studies. 4. Interventional:  Patient is doing significantly better after Medrol Dosepak and physical therapy. Can consider lumbar epidural steroid injection if radicular pain returns. 5. Follow up:  2-3 months      Brendan Bell was instructed to call the office if his symptoms worsen or if new symptoms appear prior to the next scheduled visit. He is specifically instructed to contact the office between now & his scheduled appointment if he has concerns related to his condition or if he needs assistance in scheduling the above tests. He is welcome to call for an appointment sooner if he has any additional concerns or questions. Kristy De Jesus ATC, am scribing for and in the presence of Krysta Arambula PA-C.    10/07/20 8:50 AM Sarath Hanson ATC    I, Krysta Arambula PAC,  personally performed the services described in this documentation as scribed by IFEANYI Mcadams in my presence and it is both accurate and complete. The physical examination was performed between the patient and Krysta Arambula PA-C All counseling during the appointment was performed between the patient and provider. Abdoul Aranda PA-C  Board Certified by the [employer] on Certification of Physician Assistants  Estella Urban 57  Partner of [facility] ([facility])         This dictation was performed with a verbal recognition program [facility]) and it was checked for errors. It is possible that there are still dictated errors within this office note. If so, please bring any errors to my attention for an addendum. All efforts were made to ensure that this office note is accurate.

## 2020-10-09 ENCOUNTER — HOSPITAL ENCOUNTER (OUTPATIENT)
Dept: PHYSICAL THERAPY | Age: 35
Setting detail: THERAPIES SERIES
Discharge: HOME | End: 2020-10-09
Payer: COMMERCIAL

## 2020-10-09 PROCEDURE — 97140 MANUAL THERAPY 1/> REGIONS: CPT

## 2020-10-09 PROCEDURE — 97110 THERAPEUTIC EXERCISES: CPT

## 2020-10-09 NOTE — FLOWSHEET NOTE
[place] [id number] [address]  Phone: [phone]   Fax:     [phone]    Physical Therapy Daily Treatment Note  Date:  10/9/2020    Patient Name:  Remy Ness    :  1985  MRN: [id number]  Restrictions/Precautions:    Medical/Treatment Diagnosis Information:  Diagnosis: M54.16 (ICD-10-CM) - Lumbar radiculopathy ; M51.26 (ICD-10-CM) - HNP (herniated nucleus pulposus), lumbar; S39.012A (ICD-10-CM) - Acute myofascial strain of lumbar region, initial encounter  Treatment Diagnosis: M54.5 Low Back Pain  Insurance/Certification information:  PT Insurance Information: [place]  Physician Information:  Referring Practitioner: SHANNA Hernandez  Has the plan of care been signed (Y/N):        []  Yes  [x]  No     Date of Patient follow up with Physician:       Is this a Progress Report:     []  Yes  [x]  No        If Yes:  Date Range for reporting period:  Beginning 20  Ending    Progress report will be due (10 Rx or 30 days whichever is less):       Recertification will be due (POC Duration  / 90 days whichever is less):         Visit # Insurance Allowable Auth Required   4 20 []  Yes [x]  No        Functional Scale: Oswestry: 36% deficit   Date assessed:  20     Latex Allergy:  [x]NO      []YES  Preferred Language for Healthcare:   [x]English       []other:      Pain level:  0/10 10/9    SUBJECTIVE:  10/9 Patient states the feels a little tight this morning but otherwise doing well.       OBJECTIVE:   ROM     Comments   Trunk flexion WNL + pain upon returning    Trunk extension WNL    Trunk R sidebend WNL    Trunk L sidebend WNL    Trunk R rotation WNL    Trunk L rotation WNL    HS flexibility Severe restriction                       Strength   Left Right Comments   Hip flexion(L2) 5 5 + pain present    Knee extension(L3) 5 5    Knee flexion(S1-2) 5 5    Ankle dorsiflexion(L4) 5 5 Toe extension(L5) 5 5    Ankle eversion/plantar flexion(S1) 5 5    Hip abd             Special tests 9/18   Comments   SLR      Slump test      Pelvic symmetry       Segmental Spinal mobility      Heel walk negative     Toe walk negative     Tandem walk negative                DTRs  9/18 Left Right Comments   Patellar(L3-L4) = =     Achilles(S1-S2) = =                  Joint mobility: 9/18              [x]Normal               []Hypo              []Hyper     Palpation: Denies TTP  9/18     Functional Mobility/Transfers: Independent with increased pain with ADL's; light duty at work; difficulty sleeping; unable to sit for periods longer than 1 hour; unable to walk distances longer than 1 mile; tightness and pain with playing golf  9/18      Posture: Decreased lumbar lordosis  9/18     Gait: (include devices/WB status) WNL  9/18                          [x] Patient history, allergies, meds reviewed. Medical chart reviewed. See intake form. 9/18     Review Of Systems (ROS):  [x]Performed Review of systems (Integumentary, CardioPulmonary, Neurological) by intake and observation. Intake form has been scanned into medical record. Patient has been instructed to contact their primary care physician regarding ROS issues if not already being addressed at this time.   9/18    RESTRICTIONS/PRECAUTIONS: None    Exercises/Interventions:   ROM/stretches     SKTC     DKTC 30 sec x 5    Prayer stretch     Supine HS 30 sec x 5    Pelvic tilt     Hook lying rotation 30x    Cat and camel 30x    Supine Piriformis 30 sec x 5    Strengthening     SLR ABD 3 x 10 Start 10/9   TA Iso with 90/90 marcing 3 x 10 Start 10/9   TA Iso with 90/90 LE and OH UE flexion 3 x 10 Start 10/9   Quadruped alternate UE reaches     Quadruped alternate LE reaches     Quadruped alternate UE/LE reaches     Ball squats     Ball heel raises     Sit ups      planks     Tband lat pulls     Tband rows         Manual Intervention             Prone PA      GISTM/STM Lumbar Manip      SI Manip      Hip belt mobs      Hip LA distraction 10 min  Start 9/25   [facility] Thoracic paraspinals 10 min  Start 9/25     Therapeutic Exercise and NMR EXR  [x] (55189) Provided verbal/tactile cueing for activities related to strengthening, flexibility, endurance, ROM  for improvements in proximal hip and core control with self care, mobility, lifting and ambulation.  [] (55550) Provided verbal/tactile cueing for activities related to improving balance, coordination, kinesthetic sense, posture, motor skill, proprioception  to assist with core control in self care, mobility, lifting, and ambulation.      Therapeutic Activities:    [] (06881 or 86915) Provided verbal/tactile cueing for activities related to improving balance, coordination, kinesthetic sense, posture, motor skill, proprioception and motor activation to allow for proper function  with self care and ADLs  [] (53941) Provided training and instruction to the patient for proper core and proximal hip recruitment and positioning with ambulation re-education     Home Exercise Program:    [x] (93509) Reviewed/Progressed HEP activities related to strengthening, flexibility, endurance, ROM of core, proximal hip and LE for functional self-care, mobility, lifting and ambulation   [] (51819) Reviewed/Progressed HEP activities related to improving balance, coordination, kinesthetic sense, posture, motor skill, proprioception of core, proximal hip and LE for self care, mobility, lifting, and ambulation      Manual Treatments:  PROM / STM / Oscillations-Mobs:  G-I, II, III, IV (PA's, Inf., Post.)  [x] (98285) Provided manual therapy to mobilize proximal hip and LS spine soft tissue/joints for the purpose of modulating pain, promoting relaxation,  increasing ROM, reducing/eliminating soft tissue swelling/inflammation/restriction, improving soft tissue extensibility and allowing for proper ROM for normal function with self care, mobility, lifting and ambulation. Modalities: MHP 15 min  10/9    Charges:  Timed Code Treatment Minutes: 45   Total Treatment Minutes: 60  [id number]        [] EVAL (LOW) 53917 (typically 20 minutes face-to-face)  [] EVAL (MOD) 02588 (typically 30 minutes face-to-face)  [] EVAL (HIGH) 63958 (typically 45 minutes face-to-face)  [] RE-EVAL      [x] IE(73891) x 1   [] IONTO  [] NMR (82206) x     [] VASO  [x] Manual (52312) x 2     [] Other:  [] TA x      [] Mech Traction (46596)  [] ES(attended) (90958)      [] ES (un) (98638):     Goals:   Patient stated goal: Reduce pain; Be able to play golf    [] Progressing: [] Met: [] Not Met: [] Adjusted    Therapist goals for Patient:   Short Term Goals: To be achieved in: 2 weeks  1. Independent in HEP and progression per patient tolerance, in order to prevent re-injury. [] Progressing: [] Met: [] Not Met: [] Adjusted   2. Patient will have a decrease in pain to facilitate improvement in movement, function, and ADLs as indicated by Functional Deficits. [] Progressing: [] Met: [] Not Met: [] Adjusted    Long Term Goals: To be achieved in: 6 weeks  1. Disability index score of 15% or less for the [place] to assist with reaching prior level of function. [] Progressing: [] Met: [] Not Met: [] Adjusted  2. Patient will demonstrate increased AROM to WNL, good LS mobility, good hip ROM to allow for proper joint functioning as indicated by patients Functional Deficits. [] Progressing: [] Met: [] Not Met: [] Adjusted  3. Patient will demonstrate an increase in Strength to good proximal hip and core activation to allow for proper functional mobility as indicated by patients Functional Deficits. [] Progressing: [] Met: [] Not Met: [] Adjusted  4. Patient will return to Independent functional activities without increased symptoms or restriction. [] Progressing: [] Met: [] Not Met: [] Adjusted  5. Patient will report playing a round of golf with no increase in pain.     [] Progressing: [] Met: [] Not Met: [] Adjusted     Overall Progression Towards Functional goals/ Treatment Progress Update:  [] Patient is progressing as expected towards functional goals listed. [] Progression is slowed due to complexities/Impairments listed. [] Progression has been slowed due to co-morbidities. [x] Plan just implemented, too soon to assess goals progression <30days   [] Goals require adjustment due to lack of progress  [] Patient is not progressing as expected and requires additional follow up with physician  [] Other    Prognosis for POC: [x] Good [] Fair  [] Poor      Patient requires continued skilled intervention: [x] Yes  [] No    Treatment/Activity Tolerance:  [x] Patient able to complete treatment  [] Patient limited by fatigue  [] Patient limited by pain     [] Patient limited by other medical complications  [x] Other: 10/9 Patient tolerated treatment well this session. Reported fatigue with addition of core exercises. HEP updated and reviewed with patient. Continue to progress as tolerated. Patient education:      Prognosis: [x] Good [] Fair  [] Poor    Patient Requires Follow-up: [x] Yes  [] No    PLAN: See eval  [x] Continue per plan of care [] Alter current plan (see comments)  [] Plan of care initiated [] Hold pending MD visit [] Discharge    Electronically signed by: Michael Rosario PT, DPT    *If patient does not return for further follow ups after this date. Please consider this as the patients discharge from physical therapy.

## 2020-10-16 ENCOUNTER — HOSPITAL ENCOUNTER (OUTPATIENT)
Dept: PHYSICAL THERAPY | Age: 35
Setting detail: THERAPIES SERIES
Discharge: HOME | End: 2020-10-16
Payer: COMMERCIAL

## 2020-10-16 PROCEDURE — 97110 THERAPEUTIC EXERCISES: CPT

## 2020-10-16 PROCEDURE — 97140 MANUAL THERAPY 1/> REGIONS: CPT

## 2020-10-16 NOTE — DISCHARGE SUMMARY
The [address] [place] [address], [address] [place] [address], [address] [place] [address]  Phone: [phone]   Fax:     [phone]   Physical Therapy Discharge Summary    Dear  ,    We had the pleasure of treating the following patient for physical therapy services at [address] [place] [address]. A summary of our findings can be found in the discharge summary below. If you have any questions or concerns regarding these findings, please do not hesitate to contact me at the office phone number checked above.   Thank you for the referral.     Physician Signature:________________________________Date:__________________  By signing above (or electronic signature), therapists plan is approved by physician      Overall Response to Treatment:   []Patient is responding well to treatment and improvement is noted with regards  to goals   []Patient should continue to improve in reasonable time if they continue HEP   []Patient has plateaued and is no longer responding to skilled PT intervention    []Patient is getting worse and would benefit from return to referring MD   []Patient unable to adhere to initial POC   [x]Other: Discharge to HEP    Date range of Visits: 20-10/16/20    Total Visits: 5    Physical Therapy Daily Treatment Note  Date:  10/16/2020    Patient Name:  Fam Perkins    :  1985  MRN: [id number]  Restrictions/Precautions:    Medical/Treatment Diagnosis Information:  Diagnosis: M54.16 (ICD-10-CM) - Lumbar radiculopathy ; M51.26 (ICD-10-CM) - HNP (herniated nucleus pulposus), lumbar; S39.012A (ICD-10-CM) - Acute myofascial strain of lumbar region, initial encounter  Treatment Diagnosis: M54.5 Low Back Pain  Insurance/Certification information:  PT Insurance Information: [employer]  Physician Information:  Referring Practitioner: SHANNA Collier  Has the plan of care been signed (Y/N):        []  Yes  [x]  No     Date of Patient follow up with Physician:       Is this a Progress Report:     []  Yes  [x]  No        If Yes:  Date Range for reporting period:  Beginning 9/18/20  Ending    Progress report will be due (10 Rx or 30 days whichever is less): 98/07/88      Recertification will be due (POC Duration  / 90 days whichever is less):         Visit # Insurance Allowable Auth Required   5 20 []  Yes [x]  No        Functional Scale: Oswestry: 4% deficit   Date assessed:  10/16/20     Latex Allergy:  [x]NO      []YES  Preferred Language for Healthcare:   [x]English       []other:      Pain level:  0/10 10/16    SUBJECTIVE:  10/16 Patient states that he is doing pretty well. He states that he had some pain earlier this week but with stretches it is better. He states that he feels tight this morning.  OBJECTIVE:   ROM 10/16   Comments   Trunk flexion WNL     Trunk extension WNL    Trunk R sidebend WNL    Trunk L sidebend WNL    Trunk R rotation WNL    Trunk L rotation WNL    HS flexibility moderate restriction                       Strength  10/16 Left Right Comments   Hip flexion(L2) 5 5     Knee extension(L3) 5 5    Knee flexion(S1-2) 5 5    Ankle dorsiflexion(L4) 5 5    Toe extension(L5) 5 5    Ankle eversion/plantar flexion(S1) 5 5    Hip abd             Special tests 10/16   Comments   SLR      Slump test      Pelvic symmetry       Segmental Spinal mobility      Heel walk negative     Toe walk negative     Tandem walk negative                DTRs  10/16 Left Right Comments   Patellar(L3-L4) = =     Achilles(S1-S2) = =                  Joint mobility: 10/16              [x]Normal               []Hypo              []Hyper     Palpation: Denies TTP  10/16     Functional Mobility/Transfers: Independent   10/16      Posture: Decreased lumbar lordosis  10/16     Gait: (include devices/WB status) WNL  10/16                          [x] Patient history, allergies, meds reviewed. Medical chart reviewed. See intake form.  9/18     Review Of Systems (ROS):  [x]Performed Review of systems (Integumentary, CardioPulmonary, Neurological) by intake and observation. Intake form has been scanned into medical record. Patient has been instructed to contact their primary care physician regarding ROS issues if not already being addressed at this time. 9/18    RESTRICTIONS/PRECAUTIONS: None    Exercises/Interventions:   ROM/stretches     SKTC 30 sec x 3 Start 10/16   DKTC 30 sec x 5    Prayer stretch     Supine HS 30 sec x 5    Pelvic tilt     Hook lying rotation 30x    Cat and camel 30x    Supine Piriformis 30 sec x 5    Strengthening     TA Iso with ball squeeze 10 sec x 10 Start 10/16   SLR ABD 3 x 10 Start 10/9   TA Iso with 90/90 marcing 3 x 10 Start 10/9   TA Iso with 90/90 LE and OH UE flexion 3 x 10 Start 10/9   Clamshells 3 x 10 Start 10/16   Quadruped alternate UE reaches     Quadruped alternate LE reaches     Quadruped alternate UE/LE reaches     [employer] heel raises     Sit ups      planks     Tband lat pulls     Tband rows         Manual Intervention             Prone PA      GISTM/STM      Lumbar Manip      SI Manip      Hip belt mobs      Hip LA distraction 10 min  Start 9/25   [facility] Thoracic paraspinals 10 min  Start 9/25     Therapeutic Exercise and NMR EXR  [x] (26706) Provided verbal/tactile cueing for activities related to strengthening, flexibility, endurance, ROM  for improvements in proximal hip and core control with self care, mobility, lifting and ambulation.  [] (35662) Provided verbal/tactile cueing for activities related to improving balance, coordination, kinesthetic sense, posture, motor skill, proprioception  to assist with core control in self care, mobility, lifting, and ambulation.      Therapeutic Activities:    [] (14076 or 40145) Provided verbal/tactile cueing for activities related to improving balance, coordination, kinesthetic sense, posture, motor skill, proprioception and motor activation to allow for proper function with self care and ADLs  [] (83776) Provided training and instruction to the patient for proper core and proximal hip recruitment and positioning with ambulation re-education     Home Exercise Program:    [x] (42034) Reviewed/Progressed HEP activities related to strengthening, flexibility, endurance, ROM of core, proximal hip and LE for functional self-care, mobility, lifting and ambulation   [] (77210) Reviewed/Progressed HEP activities related to improving balance, coordination, kinesthetic sense, posture, motor skill, proprioception of core, proximal hip and LE for self care, mobility, lifting, and ambulation      Manual Treatments:  PROM / STM / Oscillations-Mobs:  G-I, II, III, IV (PA's, Inf., Post.)  [x] (54695) Provided manual therapy to mobilize proximal hip and LS spine soft tissue/joints for the purpose of modulating pain, promoting relaxation,  increasing ROM, reducing/eliminating soft tissue swelling/inflammation/restriction, improving soft tissue extensibility and allowing for proper ROM for normal function with self care, mobility, lifting and ambulation. Modalities: [facility] 15 min  10/16    Charges:  Timed Code Treatment Minutes: 45   Total Treatment Minutes: 77  [phone]        [] EVAL (LOW) 60315 (typically 20 minutes face-to-face)  [] EVAL (MOD) 15508 (typically 30 minutes face-to-face)  [] EVAL (HIGH) 09926 (typically 45 minutes face-to-face)  [] RE-EVAL      [x] XY(99650) x 1   [] IONTO  [] NMR (95811) x     [] VASO  [x] Manual (62700) x 2     [] Other:  [] TA x      [] Mech Traction (46201)  [] ES(attended) (92038)      [] ES (un) (29344):     Goals:   Patient stated goal: Reduce pain; Be able to play golf    [] Progressing: [] Met: [] Not Met: [] Adjusted    Therapist goals for Patient:   Short Term Goals: To be achieved in: 2 weeks  1. Independent in HEP and progression per patient tolerance, in order to prevent re-injury. [] Progressing: [x] Met: [] Not Met: [] Adjusted   2.  Patient will have a decrease in pain to facilitate improvement in movement, function, and ADLs as indicated by Functional Deficits. [] Progressing: [x] Met: [] Not Met: [] Adjusted    Long Term Goals: To be achieved in: 6 weeks  1. Disability index score of 15% or less for the [place] to assist with reaching prior level of function. [] Progressing: [x] Met: [] Not Met: [] Adjusted  2. Patient will demonstrate increased AROM to WNL, good LS mobility, good hip ROM to allow for proper joint functioning as indicated by patients Functional Deficits. [] Progressing: [x] Met: [] Not Met: [] Adjusted  3. Patient will demonstrate an increase in Strength to good proximal hip and core activation to allow for proper functional mobility as indicated by patients Functional Deficits. [] Progressing: [x] Met: [] Not Met: [] Adjusted  4. Patient will return to Independent functional activities without increased symptoms or restriction. [] Progressing: [x] Met: [] Not Met: [] Adjusted  5. Patient will report playing a round of golf with no increase in pain. [] Progressing: [x] Met: [] Not Met: [] Adjusted     Overall Progression Towards Functional goals/ Treatment Progress Update:  [x] Patient is progressing as expected towards functional goals listed. [] Progression is slowed due to complexities/Impairments listed. [] Progression has been slowed due to co-morbidities.   [] Plan just implemented, too soon to assess goals progression <30days   [] Goals require adjustment due to lack of progress  [] Patient is not progressing as expected and requires additional follow up with physician  [] Other    Prognosis for POC: [x] Good [] Fair  [] Poor       Patient requires continued skilled intervention: [] Yes  [x] No    Treatment/Activity Tolerance:  [x] Patient able to complete treatment  [] Patient limited by fatigue  [] Patient limited by pain     [] Patient limited by other medical complications  [x] Other: 10/16 Discharge to HEP    Patient education:      Prognosis: [x] Good [] Fair  [] Poor    Patient Requires Follow-up: [] Yes  [x] No    PLAN:   [] Continue per plan of care [] Alter current plan (see comments)  [] Plan of care initiated [] Hold pending MD visit [x] Discharge    Electronically signed by: Michael Rosario PT, DPT    *If patient does not return for further follow ups after this date. Please consider this as the patients discharge from physical therapy.

## 2021-11-30 DIAGNOSIS — M54.40 ACUTE LOW BACK PAIN WITH SCIATICA, SCIATICA LATERALITY UNSPECIFIED, UNSPECIFIED BACK PAIN LATERALITY: Primary | ICD-10-CM

## 2021-11-30 RX ORDER — BACLOFEN 10 MG/1
10 TABLET ORAL 3 TIMES DAILY
Qty: 15 TABLET | Refills: 0 | Status: SHIPPED | OUTPATIENT
Start: 2021-11-30

## 2021-11-30 RX ORDER — PREDNISONE 10 MG/1
TABLET ORAL
Qty: 42 TABLET | Refills: 0 | Status: SHIPPED | OUTPATIENT
Start: 2021-11-30

## 2022-04-04 DIAGNOSIS — B35.0 TINEA CAPITIS: Primary | ICD-10-CM

## 2022-04-04 RX ORDER — NAFTIFINE HYDROCHLORIDE 20 MG/G
1 CREAM TOPICAL DAILY
Qty: 45 G | Refills: 1 | Status: SHIPPED | OUTPATIENT
Start: 2022-04-04

## 2022-04-04 RX ORDER — KETOCONAZOLE 20 MG/ML
SHAMPOO TOPICAL
Qty: 120 ML | Refills: 2 | Status: SHIPPED | OUTPATIENT
Start: 2022-04-04